# Patient Record
Sex: FEMALE | Race: WHITE | URBAN - METROPOLITAN AREA
[De-identification: names, ages, dates, MRNs, and addresses within clinical notes are randomized per-mention and may not be internally consistent; named-entity substitution may affect disease eponyms.]

---

## 2016-12-23 LAB
ABO + RH BLD: NORMAL
ABO + RH BLD: NORMAL
BLD GP AB SCN SERPL QL: NEGATIVE
C TRACH DNA SPEC QL PROBE+SIG AMP: NORMAL
CULTURE MICRO: NORMAL
HBV SURFACE AG SERPL QL IA: NORMAL
HEMOGLOBIN: 11.4 G/DL (ref 11.7–15.7)
HEMOGLOBIN: 11.4 G/DL (ref 11.7–15.7)
HEMOGLOBIN: 13.3 G/DL (ref 11.7–15.7)
HIV 1+2 AB+HIV1 P24 AG SERPL QL IA: NORMAL
N GONORRHOEA DNA SPEC QL PROBE+SIG AMP: NORMAL
RUBELLA ANTIBODY IGG QUANTITATIVE: NORMAL IU/ML
T PALLIDUM IGG SER QL: NORMAL

## 2017-04-25 LAB
GLU GEST SCREEN 1HR 50G: 135
HEMOGLOBIN: 11.4 G/DL (ref 11.7–15.7)

## 2017-06-06 LAB — GROUP B STREP PCR: NEGATIVE

## 2017-06-22 ENCOUNTER — PRENATAL OFFICE VISIT (OUTPATIENT)
Dept: OBGYN | Facility: OTHER | Age: 32
End: 2017-06-22

## 2017-06-22 VITALS
SYSTOLIC BLOOD PRESSURE: 108 MMHG | HEIGHT: 64 IN | DIASTOLIC BLOOD PRESSURE: 64 MMHG | HEART RATE: 80 BPM | BODY MASS INDEX: 24.75 KG/M2 | WEIGHT: 145 LBS

## 2017-06-22 DIAGNOSIS — Z34.80 SUPERVISION OF OTHER NORMAL PREGNANCY, ANTEPARTUM: Primary | ICD-10-CM

## 2017-06-22 DIAGNOSIS — O09.899 RUBELLA NON-IMMUNE STATUS, ANTEPARTUM: ICD-10-CM

## 2017-06-22 DIAGNOSIS — Z28.39 RUBELLA NON-IMMUNE STATUS, ANTEPARTUM: ICD-10-CM

## 2017-06-22 PROCEDURE — 99201 ZZC OFFICE/OUTPT VISIT, NEW, LEVEL I: CPT | Performed by: ADVANCED PRACTICE MIDWIFE

## 2017-06-22 RX ORDER — PRENATAL VIT/IRON FUM/FOLIC AC 27MG-0.8MG
1 TABLET ORAL DAILY
COMMUNITY

## 2017-06-22 ASSESSMENT — ANXIETY QUESTIONNAIRES
7. FEELING AFRAID AS IF SOMETHING AWFUL MIGHT HAPPEN: NOT AT ALL
5. BEING SO RESTLESS THAT IT IS HARD TO SIT STILL: NOT AT ALL
IF YOU CHECKED OFF ANY PROBLEMS ON THIS QUESTIONNAIRE, HOW DIFFICULT HAVE THESE PROBLEMS MADE IT FOR YOU TO DO YOUR WORK, TAKE CARE OF THINGS AT HOME, OR GET ALONG WITH OTHER PEOPLE: SOMEWHAT DIFFICULT
1. FEELING NERVOUS, ANXIOUS, OR ON EDGE: SEVERAL DAYS
3. WORRYING TOO MUCH ABOUT DIFFERENT THINGS: SEVERAL DAYS
2. NOT BEING ABLE TO STOP OR CONTROL WORRYING: SEVERAL DAYS
GAD7 TOTAL SCORE: 3
6. BECOMING EASILY ANNOYED OR IRRITABLE: NOT AT ALL

## 2017-06-22 ASSESSMENT — PATIENT HEALTH QUESTIONNAIRE - PHQ9: 5. POOR APPETITE OR OVEREATING: NOT AT ALL

## 2017-06-22 NOTE — MR AVS SNAPSHOT
After Visit Summary   6/22/2017    Teresa Venegas    MRN: [id number]           Patient Information     Date Of Birth          1985        Visit Information        Provider Department      6/22/2017 2:15 PM Maylin Clemente APRN [facility]        Today's Diagnoses     Supervision of other normal pregnancy, antepartum    -  1    Rubella non-immune status, antepartum          Care Instructions    Thank for choosing our clinic for your health care needs. We aim to provided you with excellent care. One way that we continue to improve our care is by listening to our patients. Please take a few minutes to complete the written survey that you may receive in the mail after your visit.     You may reach your care team by calling the following numbers:    [place]- [phone]   For clinic hours at [facility]  please visit the [place] web site [email or web address]    Notification of your lab results:  Normal or non-critical lab and imaging results will be communicated to you by Mychart, letter, or phone within 7 days. If you do not hear from us within 10 days, please contact us through "[employer]hart or phone. If you have a critical or abnormal lab result, we will notify you by phone as soon as possible.      After Hours nurse advice:  [place] Nurse Advisors:  [phone]     Medication Refills:  Please contact your pharmacy and they will forward the refill to us. Please allow 3 business days for your refills to be completed.     Secure access to your medical record:  Use Instant APIt (secure email communication and access to your chart) to send your primary care provider a message or make an appointment. Ask someone on your Team how to sign up for Serometrix. To log on to [employer] or for more information in Serometrix please visit the website at [email or web address].      OBGYN Care Team               Maylin Saravia CNM   Clinic hours: Tuesday 8-5 , Thursday 1145-7 and every other  "Friday 8-5 at [place]   Wednesday 8-5 at [place] Gagnon DO  Clinic hours 7-6 Monday at [place]  8-5 Tuesdays at [place]  7-12 Fridays [facility]  1-5 Fridays at [place]    Nazia Moy MD  Clinic hours: [place] Monday and Thursday 8:15-5  [place] and Friday 8-5                                    Follow-ups after your visit        Follow-up notes from your care team     Return in about 1 week (around 6/29/2017).      Who to contact     If you have questions or need follow up information about today's clinic visit or your schedule please contact [facility] directly at [phone].  Normal or non-critical lab and imaging results will be communicated to you by OceanTailerhart, letter or phone within 4 business days after the clinic has received the results. If you do not hear from us within 7 days, please contact the clinic through OceanTailerhart or phone. If you have a critical or abnormal lab result, we will notify you by phone as soon as possible.  Submit refill requests through Progression or call your pharmacy and they will forward the refill request to us. Please allow 3 business days for your refill to be completed.          Additional Information About Your Visit        OceanTailerhart Information     Progression gives you secure access to your electronic health record. If you see a primary care provider, you can also send messages to your care team and make appointments. If you have questions, please call your primary care clinic.  If you do not have a primary care provider, please call [phone] and they will assist you.        Care EveryWhere ID     This is your Care EveryWhere ID. This could be used by other organizations to access your [place] medical records  [id number]        Your Vitals Were     Pulse Height Last Period BMI (Body Mass Index)          80 5' 3.75\" (1.619 m) 09/28/2016 25.08 kg/m2         Blood Pressure from Last 3 Encounters:   06/22/17 108/64    Weight from Last 3 " Encounters:   06/22/17 145 lb (65.8 kg)              We Performed the Following     ABO and Rh     Anti Treponema     Chlamydia trachomatis PCR     Glucose tolerance gest screen 1 hour     Group B strep PCR     Hepatitis B surface antigen     HIV Antigen Antibody Combo     Neisseria gonorrhoeae PCR     OB hemoglobin     OB hemoglobin     OB hemoglobin     OB hemoglobin     Rubella Antibody IgG Quantitative     Urine Culture Aerobic Bacterial        Primary Care Provider    None Specified       No primary provider on file.        Equal Access to Services     [facility]: Hadii aad ku hadasho Soomaali, waaxda luqadaha, qaybta kaalmada adeegyada, waxay idiin hayaan adedenise luluginny lafazal . So [facility] [phone].    ATENCIÓN: Si habla español, tiene a santana disposición servicios gratuitos de asistencia lingüística. Llame al [phone].    We comply with applicable federal civil rights laws and [place] laws. We do not discriminate on the basis of race, color, national origin, age, disability sex, sexual orientation or gender identity.            Thank you!     Thank you for choosing [facility]  for your care. Our goal is always to provide you with excellent care. Hearing back from our patients is one way we can continue to improve our services. Please take a few minutes to complete the written survey that you may receive in the mail after your visit with us. Thank you!             Your Updated Medication List - Protect others around you: Learn how to safely use, store and throw away your medicines at www.disposemymeds.org.          This list is accurate as of: 6/22/17  3:03 PM.  Always use your most recent med list.                   Brand Name Dispense Instructions for use Diagnosis    prenatal multivitamin  plus iron 27-0.8 MG Tabs per tablet      Take 1 tablet by mouth daily

## 2017-06-22 NOTE — NURSING NOTE
"Chief Complaint   Patient presents with     Prenatal Care       Initial /64 (BP Location: Right arm, Patient Position: Chair, Cuff Size: Adult Regular)  Pulse 80  Ht 5' 3.75\" (1.619 m)  Wt 145 lb (65.8 kg)  BMI 25.08 kg/m2 Estimated body mass index is 25.08 kg/(m^2) as calculated from the following:    Height as of this encounter: 5' 3.75\" (1.619 m).    Weight as of this encounter: 145 lb (65.8 kg).  Medication Reconciliation: sophia Crystal CMA      "

## 2017-06-22 NOTE — PATIENT INSTRUCTIONS
Thank for choosing our clinic for your health care needs. We aim to provided you with excellent care. One way that we continue to improve our care is by listening to our patients. Please take a few minutes to complete the written survey that you may receive in the mail after your visit.     You may reach your care team by calling the following numbers:    [place]- [phone]   For clinic hours at [facility]  please visit the [place] web site [email or web address]    Notification of your lab results:  Normal or non-critical lab and imaging results will be communicated to you by Mychart, letter, or phone within 7 days. If you do not hear from us within 10 days, please contact us through Mychart or phone. If you have a critical or abnormal lab result, we will notify you by phone as soon as possible.      After Hours nurse advice:  [place] Nurse Advisors:  [phone]     Medication Refills:  Please contact your pharmacy and they will forward the refill to us. Please allow 3 business days for your refills to be completed.     Secure access to your medical record:  Use Muzy (secure email communication and access to your chart) to send your primary care provider a message or make an appointment. Ask someone on your Team how to sign up for Muzy. To log on to Dormir or for more information in Muzy please visit the website at [email or web address].      OBGYN Care Team               Janis Keil Day [facility]   Clinic hours: Tuesday 8-5 , Thursday 1145-7 and every other Friday 8-5 at [place]   Wednesday 8-5 at [place]agnon DO  Clinic hours 7-6 Monday at [place]  8-5 Tuesdays at [place]  7-12 Fridays [facility]  1-5 Fridays at [place]    Nazia Moy MD  Clinic hours: [place] Monday and Thursday 8:15-5  [place] and Friday 8-5

## 2017-06-22 NOTE — PROGRESS NOTES
Transfer from [place] see Care Every Where for visits and labs.   Needs follow up on her pap.  States she had it at [facility] but no record currently in Epic.  Will do JAI for pap otherwise repeat PP  Reviewed Labor instructions.  Knows where [facility].   Reviewed care here.   GBS is negative.   Declines exam.   No contra/LOF/VB  Will circ and BF.  A bit uncertain about peds but has taken her other children somewhere that is non [place].   RTC 1 week.  Maylin Saravia, MIGUEL, MINOOM

## 2017-06-23 ASSESSMENT — ANXIETY QUESTIONNAIRES: GAD7 TOTAL SCORE: 3

## 2017-06-23 ASSESSMENT — PATIENT HEALTH QUESTIONNAIRE - PHQ9: SUM OF ALL RESPONSES TO PHQ QUESTIONS 1-9: 3

## 2017-06-30 ENCOUNTER — PRENATAL OFFICE VISIT (OUTPATIENT)
Dept: OBGYN | Facility: CLINIC | Age: 32
End: 2017-06-30

## 2017-06-30 VITALS
HEART RATE: 100 BPM | DIASTOLIC BLOOD PRESSURE: 70 MMHG | SYSTOLIC BLOOD PRESSURE: 114 MMHG | WEIGHT: 144 LBS | BODY MASS INDEX: 24.91 KG/M2

## 2017-06-30 DIAGNOSIS — Z34.80 SUPERVISION OF OTHER NORMAL PREGNANCY, ANTEPARTUM: Primary | ICD-10-CM

## 2017-06-30 DIAGNOSIS — O09.899 RUBELLA NON-IMMUNE STATUS, ANTEPARTUM: ICD-10-CM

## 2017-06-30 DIAGNOSIS — Z28.39 RUBELLA NON-IMMUNE STATUS, ANTEPARTUM: ICD-10-CM

## 2017-06-30 PROCEDURE — 99212 OFFICE O/P EST SF 10 MIN: CPT | Performed by: OBSTETRICS & GYNECOLOGY

## 2017-06-30 ASSESSMENT — PAIN SCALES - GENERAL: PAINLEVEL: NO PAIN (0)

## 2017-06-30 NOTE — PATIENT INSTRUCTIONS
What to watch out for are: regular contractions every 5 min, vaginal bleeding, decreased fetal movement, or leakage of fluid.  Please call the office or go to L&D if you develop any of these signs and symptoms.      I will see you for your follow up appointment.  Please feel free to call if you have any questions or concerns.      Thanks,  Ame Gagnon, DO

## 2017-06-30 NOTE — PROGRESS NOTES
32 year old  at 39w2d weeks presents to the clinic for a routine prenatal visit.  No concerns.  Complains of feeling more pressure.  No vaginal bleeding, leakage of fluid, or contractions   Fundal height=40cm  NGIr=637's  CX=3/80/-2  Discussed labor precautions  RTC 1 week  GBS=Negative    Ame Gagnon

## 2017-06-30 NOTE — MR AVS SNAPSHOT
After Visit Summary   6/30/2017    Teresa Venegas    MRN: [id number]           Patient Information     Date Of Birth          1985        Visit Information        Provider Department      6/30/2017 9:45 AM Ame Gagnon, DO [facility]        Today's Diagnoses     Supervision of other normal pregnancy, antepartum    -  1    Rubella non-immune status, antepartum          Care Instructions    What to watch out for are: regular contractions every 5 min, vaginal bleeding, decreased fetal movement, or leakage of fluid.  Please call the office or go to L&D if you develop any of these signs and symptoms.      I will see you for your follow up appointment.  Please feel free to call if you have any questions or concerns.      Thanks,  Ame Gagnon, DO            Follow-ups after your visit        Follow-up notes from your care team     Return in about 1 week (around 7/7/2017).      Who to contact     If you have questions or need follow up information about today's clinic visit or your schedule please contact [facility] directly at [phone].  Normal or non-critical lab and imaging results will be communicated to you by MyChart, letter or phone within 4 business days after the clinic has received the results. If you do not hear from us within 7 days, please contact the clinic through Efficient Cloudhart or phone. If you have a critical or abnormal lab result, we will notify you by phone as soon as possible.  Submit refill requests through Ifeelgoods or call your pharmacy and they will forward the refill request to us. Please allow 3 business days for your refill to be completed.          Additional Information About Your Visit        MyChart Information     Ifeelgoods gives you secure access to your electronic health record. If you see a primary care provider, you can also send messages to your care team and make appointments. If you have questions, please call your primary care clinic.   If you do not have a primary care provider, please call [phone] and they will assist you.        Care EveryWhere ID     This is your Care EveryWhere ID. This could be used by other organizations to access your [place] medical records  [id number]        Your Vitals Were     Pulse Last Period BMI (Body Mass Index)             100 09/28/2016 24.91 kg/m2          Blood Pressure from Last 3 Encounters:   06/30/17 114/70   06/22/17 108/64    Weight from Last 3 Encounters:   06/30/17 65.3 kg (144 lb)   06/22/17 65.8 kg (145 lb)              Today, you had the following     No orders found for display       Primary Care Provider    None Specified       No primary provider on file.        Equal Access to Services     LAURIE ORO : Leah Patino, arvind angeles, galina cho, pancho crawford . So [facility] [phone].    ATENCIÓN: Si habla español, tiene a santana disposición servicios gratuitos de asistencia lingüística. Llame al [phone].    We comply with applicable federal civil rights laws and [place] laws. We do not discriminate on the basis of race, color, national origin, age, disability sex, sexual orientation or gender identity.            Thank you!     Thank you for choosing [facility]  for your care. Our goal is always to provide you with excellent care. Hearing back from our patients is one way we can continue to improve our services. Please take a few minutes to complete the written survey that you may receive in the mail after your visit with us. Thank you!             Your Updated Medication List - Protect others around you: Learn how to safely use, store and throw away your medicines at www.disposemymeds.org.          This list is accurate as of: 6/30/17 10:04 AM.  Always use your most recent med list.                   Brand Name Dispense Instructions for use Diagnosis    prenatal multivitamin  plus iron 27-0.8 MG Tabs per tablet       Take 1 tablet by mouth daily

## 2017-07-06 ENCOUNTER — PRENATAL OFFICE VISIT (OUTPATIENT)
Dept: OBGYN | Facility: OTHER | Age: 32
End: 2017-07-06

## 2017-07-06 VITALS
DIASTOLIC BLOOD PRESSURE: 68 MMHG | BODY MASS INDEX: 25.17 KG/M2 | HEART RATE: 80 BPM | WEIGHT: 145.5 LBS | SYSTOLIC BLOOD PRESSURE: 120 MMHG

## 2017-07-06 DIAGNOSIS — Z34.80 SUPERVISION OF OTHER NORMAL PREGNANCY, ANTEPARTUM: Primary | ICD-10-CM

## 2017-07-06 PROCEDURE — 99207 ZZC PRENATAL VISIT: CPT | Performed by: ADVANCED PRACTICE MIDWIFE

## 2017-07-06 NOTE — NURSING NOTE
"Chief Complaint   Patient presents with     Prenatal Care       Initial /68 (BP Location: Left arm, Patient Position: Chair, Cuff Size: Adult Regular)  Pulse 80  Wt 145 lb 8 oz (66 kg)  LMP 09/28/2016  BMI 25.17 kg/m2 Estimated body mass index is 25.17 kg/(m^2) as calculated from the following:    Height as of 6/22/17: 5' 3.75\" (1.619 m).    Weight as of this encounter: 145 lb 8 oz (66 kg).  Medication Reconciliation: complete   Destiney Crystal CMA      "

## 2017-07-06 NOTE — PROGRESS NOTES
Some contractions but unable to time them.  Probably won't need induction but will schedule just in case for 7/12.  Reviewed labor instructions and to go immediately with ROM.   Maylin Saravia, APRN, CNM

## 2017-07-06 NOTE — PATIENT INSTRUCTIONS
Thank for choosing our clinic for your health care needs. We aim to provided you with excellent care. One way that we continue to improve our care is by listening to our patients. Please take a few minutes to complete the written survey that you may receive in the mail after your visit.     You may reach your care team by calling the following numbers:    [place]- [phone]   For clinic hours at [facility]  please visit the [place] web site [email or web address]    Notification of your lab results:  Normal or non-critical lab and imaging results will be communicated to you by Mychart, letter, or phone within 7 days. If you do not hear from us within 10 days, please contact us through MaidSafehart or phone. If you have a critical or abnormal lab result, we will notify you by phone as soon as possible.      After Hours nurse advice:  [place] Nurse Advisors:  [phone]     Medication Refills:  Please contact your pharmacy and they will forward the refill to us. Please allow 3 business days for your refills to be completed.     Secure access to your medical record:  Use iLEVEL Solutions (secure email communication and access to your chart) to send your primary care provider a message or make an appointment. Ask someone on your Team how to sign up for iLEVEL Solutions. To log on to [employer] or for more information in iLEVEL Solutions please visit the website at [email or web address].      OBGYN Care Team               Janis Keil Day [facility]   Clinic hours: Tuesday 8-5 , Thursday 1145-7 and every other Friday 8-5 at [place]   Wednesday 8-5 at [place] Gagnon DO  Clinic hours 7-6 Monday at [place]  8-5 Tuesdays at [place]  7-12 Fridays [facility]  1-5 Fridays at [place]    Nazia Moy MD  Clinic hours: [place] Monday and Thursday 8:15-5  [place] and Friday 8-5                          Call the [facility] at 6 AM and plan to be there by 7 AM unless they tell you differently.   The number is  [phone]  Good [place]!

## 2017-07-06 NOTE — MR AVS SNAPSHOT
After Visit Summary   7/6/2017    Teresa Venegas    MRN: [id number]           Patient Information     Date Of Birth          1985        Visit Information        Provider Department      7/6/2017 12:45 PM Maylin Clemente APRN [facility]        Today's Diagnoses     Supervision of other normal pregnancy, antepartum    -  1      Care Instructions    Thank for choosing our clinic for your health care needs. We aim to provided you with excellent care. One way that we continue to improve our care is by listening to our patients. Please take a few minutes to complete the written survey that you may receive in the mail after your visit.     You may reach your care team by calling the following numbers:    [place]- [phone]   For clinic hours at [facility]  please visit the [place] web site [email or web address]    Notification of your lab results:  Normal or non-critical lab and imaging results will be communicated to you by Mychart, letter, or phone within 7 days. If you do not hear from us within 10 days, please contact us through MedCenterDisplayhart or phone. If you have a critical or abnormal lab result, we will notify you by phone as soon as possible.      After Hours nurse advice:  [place] Nurse Advisors:  [phone]     Medication Refills:  Please contact your pharmacy and they will forward the refill to us. Please allow 3 business days for your refills to be completed.     Secure access to your medical record:  Use SpeechTranst (secure email communication and access to your chart) to send your primary care provider a message or make an appointment. Ask someone on your Team how to sign up for Pivto. To log on to "[employer] or for more information in Pivto please visit the website at [email or web address].      OBGYN Care Team               Maylin Saravia [facility]   Clinic hours: Tuesday 8-5 , Thursday 1145-7 and every other Friday 8-5 at [place]   Wednesday 8-5 at  [place] Gagnon DO  Clinic hours 7-6 Monday at [place]  8-5 Tuesdays at [place]  7-12 Fridays [place]  1-5 Fridays at [place]    Nazia Moy MD  Clinic hours: [place] Monday and Thursday 8:15-5  [place] and Friday 8-5                          Call the [facility] at 6 AM and plan to be there by 7 AM unless they tell you differently.   The number is [phone]  Good [place]!            Follow-ups after your visit        Follow-up notes from your care team     Return if symptoms worsen or fail to improve.      Who to contact     If you have questions or need follow up information about today's clinic visit or your schedule please contact [facility] directly at [phone].  Normal or non-critical lab and imaging results will be communicated to you by Patentspinhart, letter or phone within 4 business days after the clinic has received the results. If you do not hear from us within 7 days, please contact the clinic through Patentspinhart or phone. If you have a critical or abnormal lab result, we will notify you by phone as soon as possible.  Submit refill requests through First Active Media or call your pharmacy and they will forward the refill request to us. Please allow 3 business days for your refill to be completed.          Additional Information About Your Visit        Patentspinhart Information     First Active Media gives you secure access to your electronic health record. If you see a primary care provider, you can also send messages to your care team and make appointments. If you have questions, please call your primary care clinic.  If you do not have a primary care provider, please call [phone] and they will assist you.        Care EveryWhere ID     This is your Care EveryWhere ID. This could be used by other organizations to access your [place] medical records  [id number]        Your Vitals Were     Pulse Last Period BMI (Body Mass Index)             80 09/28/2016 25.17 kg/m2           Blood Pressure from Last 3 Encounters:   07/06/17 120/68   06/30/17 114/70   06/22/17 108/64    Weight from Last 3 Encounters:   07/06/17 145 lb 8 oz (66 kg)   06/30/17 144 lb (65.3 kg)   06/22/17 145 lb (65.8 kg)              Today, you had the following     No orders found for display       Primary Care Provider    None Specified       No primary provider on file.        Equal Access to Services     LAURIE ORO : Hadyovana Patino, waanjanada luerendiraadaha, qaybta kaalmada adereginald, pancho crawford . So [facility] [phone].    ATENCIÓN: Si habla español, tiene a santana disposición servicios gratuitos de asistencia lingüística. Llame al [phone].    We comply with applicable federal civil rights laws and [place] laws. We do not discriminate on the basis of race, color, national origin, age, disability sex, sexual orientation or gender identity.            Thank you!     Thank you for choosing [facility]  for your care. Our goal is always to provide you with excellent care. Hearing back from our patients is one way we can continue to improve our services. Please take a few minutes to complete the written survey that you may receive in the mail after your visit with us. Thank you!             Your Updated Medication List - Protect others around you: Learn how to safely use, store and throw away your medicines at www.disposemymeds.org.          This list is accurate as of: 7/6/17  1:26 PM.  Always use your most recent med list.                   Brand Name Dispense Instructions for use Diagnosis    prenatal multivitamin  plus iron 27-0.8 MG Tabs per tablet      Take 1 tablet by mouth daily

## 2018-01-21 ENCOUNTER — HEALTH MAINTENANCE LETTER (OUTPATIENT)
Age: 33
End: 2018-01-21

## 2020-03-10 ENCOUNTER — HEALTH MAINTENANCE LETTER (OUTPATIENT)
Age: 35
End: 2020-03-10

## 2020-12-27 ENCOUNTER — HEALTH MAINTENANCE LETTER (OUTPATIENT)
Age: 35
End: 2020-12-27

## 2021-04-24 ENCOUNTER — HEALTH MAINTENANCE LETTER (OUTPATIENT)
Age: 36
End: 2021-04-24

## 2021-10-09 ENCOUNTER — HEALTH MAINTENANCE LETTER (OUTPATIENT)
Age: 36
End: 2021-10-09

## 2022-05-21 ENCOUNTER — HEALTH MAINTENANCE LETTER (OUTPATIENT)
Age: 37
End: 2022-05-21

## 2022-09-17 ENCOUNTER — HEALTH MAINTENANCE LETTER (OUTPATIENT)
Age: 37
End: 2022-09-17

## 2023-06-03 ENCOUNTER — HEALTH MAINTENANCE LETTER (OUTPATIENT)
Age: 38
End: 2023-06-03